# Patient Record
(demographics unavailable — no encounter records)

---

## 2025-04-24 NOTE — DATA REVIEWED
[de-identified] : see HPI [de-identified] : see HPI [de-identified] : cytology and molecular test reports reviewed, ICU hospital discharge records reviewed

## 2025-04-24 NOTE — CONSULT LETTER
[Dear  ___] : Dear  [unfilled], [Consult Letter:] : I had the pleasure of evaluating your patient, [unfilled]. [Please see my note below.] : Please see my note below. [Consult Closing:] : Thank you very much for allowing me to participate in the care of this patient.  If you have any questions, please do not hesitate to contact me. [Sincerely,] : Sincerely, [FreeTextEntry3] : \par  Tomy Rosales M.D., FACS, ECNU\par  Director [facility] Thyroid [facility] Parathyroid Surgery\par  [facility] [place] [facility]\par  Certified in Thyroid/Parathyroid/Neck Ultrasound, ECNU/ AIUM\par  \par  , Department of Otolaryngology\par  [facility] [employer] [facility] at [facility]\par

## 2025-04-24 NOTE — DATA REVIEWED
[de-identified] : see HPI [de-identified] : see HPI [de-identified] : cytology and molecular test reports reviewed, ICU hospital discharge records reviewed

## 2025-04-24 NOTE — PROCEDURE
[Image(s) Captured] : image(s) captured and filed [Unable to Cooperate with Mirror] : patient unable to cooperate with mirror [Gag Reflex] : gag reflex preventing mirror examination [Topical Lidocaine] : topical lidocaine [Oxymetazoline HCl] : oxymetazoline HCl [Flexible Endoscope] : examined with the flexible endoscope [Serial Number: ___] : Serial Number: [unfilled] [Cerumen Impaction] : Cerumen Impaction [Same] : same as the Pre Op Dx. [] : Removal of Cerumen [de-identified] : Verbal informed consent was obtained from the patient.  Findings: The nasal septum is minimally deviated to the left with spurs. There are no masses or polyps, and the nasal mucosa and secretions are normal. The choanae and posterior nasopharynx are normal without masses or drainage. The Eustachian tube orifices appear patent. The pharynx, including the posterior and lateral pharyngeal walls, the vallecula and base of tongue are normal without ulcerations, lesions or masses. The hypopharynx including the pyriform sinuses open well without pooling of secretions, mucosal lesions or masses. The supraglottic larynx including the epiglottis, petiole, arytenoids, glossoepiglottic, aryepiglottic and pharyngoepiglottic folds are normal without mucosal lesions, ulcerations or masses. The glottis reveals normal false vocal folds. The true vocal folds are glistening white, tense and of equal length, without paralysis, having symmetric mobility on adduction and abduction. There are no mucosal lesions, nodules, cysts, erythroplasia or leukoplakia. The posterior cricoid area has healthy pink mucosa in the interarytenoid area and esophageal inlet. There is mild thickening/pachydermia of the interarytenoid mucosa suggestive of posterior laryngitis from laryngopharyngeal acid reflux disease. The trachea is clear without narrowing in the immediate subglottic region, without deviation or lesions. ------------------------------------------------------------------------------------------- [de-identified] : nontoxic multinodular goiter with interval growth of a right lobe nodule. [FreeTextEntry1] :  impacted cerumen hearing loss ASC [FreeTextEntry6] : Copious cerumen removed from left ear canal with instrumentation.  TM is intact with normal mobility and improved hearing to finger rub.

## 2025-04-24 NOTE — HISTORY OF PRESENT ILLNESS
[de-identified] : Porsha is a generally healthy 71-year-old female [employer]  who has had a NTMNG since 2010. She is euthyroid but with a rising TSH now in the upper limit of normal range.   She had USG FNA biopsies from bilateral dominant nodules. Her gland had been monitored with serial ultrasound imaging and there has been growth primarily of a left mid-lower lobe nodule, now 3.2 cm.  Porsha denies recent shortness of breath, voice changes, dysphagia, anterior neck pain, neck pressure or mass. There is no family history of thyroid cancer. She had radiation exposures in her youth for facial acne.  She does suffer from GERD and takes Zantac regularly. Her weight gas been stable.  -------------------------------------------------------------------------------------------------------------------------------------------------------------------------------------   [FreeTextEntry1] : Porsha returns for a f/u visit after an FNAB from a left mid-lower lobe thyroid nodule (3.45 cm) in 2019. The cytology was reported back as a follicular lesion of undetermined significance, [place] category III. On further molecular testing (Thyroseq), there were no mutations or aberrant molecular abnormalities identified with a low risk of malignancy approximately 3%. She is euthyroid.   Porsha denies recent shortness of breath, voice changes, dysphagia, anterior neck pain, neck pressure or mass. She had a follow up thyroid ultrasound dated 3/1/2024.  A right lower lobe nodule now measures 2.7 x 1.5 x 1.8.  This nodule was biopsied in 2010 and benign and measuring 2.4 x 2.2 x 1.4 cm.  It has not significantly increased in size relative to 2010.    Her last thyroid ultrasound was obtained on 4/1/2025.  This was compared to the 3/1/2024 study.  The thyroid gland is slightly enlarged.  There is a nodule again in the right lower pole measuring up to 2.5 cm that is stable benign on biopsy in 2010.  There is a sub centimeter right mid lobe nodule.  The left thyroid lobe contains 3 nodules including a left lower pole 4.8 centimeters stable nodule but has been increasing in size slowly and benign on biopsy in 2019.  There is a 1.7 cm left mid lobe nodule TI-RADS TR 1, a stable left upper pole 1.7 cm nodule, TR-4, and another sub centimeter left mid lobe nodule.  Porsha denies recent shortness of breath, voice changes, dysphagia, anterior neck pain, neck pressure or mass.  She continues to have GERD medicated with omeprazole.  She sleeps with her back elevated at night. denies fever, body aches, cough, cyanosis, chest burning, anosmia or recent known COVID exposures.  Her last COVID infection was in 2022.  All family members at home are well.  She is vaccinated and boosted. She was hospitalized from December 22 to January 2 for what appears to be a pneumonia, and she required intubation x 2 and treated in the ICU for approximately 8 days.  Since then, she continues to have an intermittent cough with light mucus and pulmonary evaluation no specific findings to explain her cough and ENT evaluation recommended.  She does continue to have GERD and is taking omeprazole daily.  Her thick mucus production is worse in the morning hours.

## 2025-04-24 NOTE — REASON FOR VISIT
[FreeTextEntry2] : follow up for a non-toxic multinodular goiter. [FreeTextEntry1] : PCP is Brianna Drummond MD

## 2025-04-24 NOTE — CONSULT LETTER
[Dear  ___] : Dear  [unfilled], [Consult Letter:] : I had the pleasure of evaluating your patient, [unfilled]. [Please see my note below.] : Please see my note below. [Consult Closing:] : Thank you very much for allowing me to participate in the care of this patient.  If you have any questions, please do not hesitate to contact me. [Sincerely,] : Sincerely, [FreeTextEntry3] : \par  Tomy Rosales M.D., FACS, ECNU\par  Director Center for Thyroid [facility] Parathyroid Surgery\par  [facility] [place] [facility]\par  Certified in Thyroid/Parathyroid/Neck Ultrasound, ECNU/ AIUM\par  \par  , Department of Otolaryngology\par  [facility] [employer] [facility] at [facility]\par

## 2025-04-24 NOTE — HISTORY OF PRESENT ILLNESS
[de-identified] : Porsha is a generally healthy 71-year-old female [employer]  who has had a NTMNG since 2010. She is euthyroid but with a rising TSH now in the upper limit of normal range.   She had USG FNA biopsies from bilateral dominant nodules. Her gland had been monitored with serial ultrasound imaging and there has been growth primarily of a left mid-lower lobe nodule, now 3.2 cm.  Porsha denies recent shortness of breath, voice changes, dysphagia, anterior neck pain, neck pressure or mass. There is no family history of thyroid cancer. She had radiation exposures in her youth for facial acne.  She does suffer from GERD and takes Zantac regularly. Her weight gas been stable.  -------------------------------------------------------------------------------------------------------------------------------------------------------------------------------------   [FreeTextEntry1] : Porsha returns for a f/u visit after an FNAB from a left mid-lower lobe thyroid nodule (3.45 cm) in 2019. The cytology was reported back as a follicular lesion of undetermined significance, [place] category III. On further molecular testing (Thyroseq), there were no mutations or aberrant molecular abnormalities identified with a low risk of malignancy approximately 3%. She is euthyroid.   Porsha denies recent shortness of breath, voice changes, dysphagia, anterior neck pain, neck pressure or mass. She had a follow up thyroid ultrasound dated 3/1/2024.  A right lower lobe nodule now measures 2.7 x 1.5 x 1.8.  This nodule was biopsied in 2010 and benign and measuring 2.4 x 2.2 x 1.4 cm.  It has not significantly increased in size relative to 2010.    Her last thyroid ultrasound was obtained on 4/1/2025.  This was compared to the 3/1/2024 study.  The thyroid gland is slightly enlarged.  There is a nodule again in the right lower pole measuring up to 2.5 cm that is stable benign on biopsy in 2010.  There is a sub centimeter right mid lobe nodule.  The left thyroid lobe contains 3 nodules including a left lower pole 4.8 centimeters stable nodule but has been increasing in size slowly and benign on biopsy in 2019.  There is a 1.7 cm left mid lobe nodule TI-RADS TR 1, a stable left upper pole 1.7 cm nodule, TR-4, and another sub centimeter left mid lobe nodule.  Porsha denies recent shortness of breath, voice changes, dysphagia, anterior neck pain, neck pressure or mass.  She continues to have GERD medicated with omeprazole.  She sleeps with her back elevated at night. denies fever, body aches, cough, cyanosis, chest burning, anosmia or recent known COVID exposures.  Her last COVID infection was in 2022.  All family members at home are well.  She is vaccinated and boosted. She was hospitalized from December 22 to January 2 for what appears to be a pneumonia, and she required intubation x 2 and treated in the ICU for approximately 8 days.  Since then, she continues to have an intermittent cough with light mucus and pulmonary evaluation no specific findings to explain her cough and ENT evaluation recommended.  She does continue to have GERD and is taking omeprazole daily.  Her thick mucus production is worse in the morning hours.

## 2025-04-24 NOTE — PROCEDURE
[Image(s) Captured] : image(s) captured and filed [Unable to Cooperate with Mirror] : patient unable to cooperate with mirror [Gag Reflex] : gag reflex preventing mirror examination [Topical Lidocaine] : topical lidocaine [Oxymetazoline HCl] : oxymetazoline HCl [Flexible Endoscope] : examined with the flexible endoscope [Serial Number: ___] : Serial Number: [unfilled] [Cerumen Impaction] : Cerumen Impaction [Same] : same as the Pre Op Dx. [] : Removal of Cerumen [de-identified] : Verbal informed consent was obtained from the patient.  Findings: The nasal septum is minimally deviated to the left with spurs. There are no masses or polyps, and the nasal mucosa and secretions are normal. The choanae and posterior nasopharynx are normal without masses or drainage. The Eustachian tube orifices appear patent. The pharynx, including the posterior and lateral pharyngeal walls, the vallecula and base of tongue are normal without ulcerations, lesions or masses. The hypopharynx including the pyriform sinuses open well without pooling of secretions, mucosal lesions or masses. The supraglottic larynx including the epiglottis, petiole, arytenoids, glossoepiglottic, aryepiglottic and pharyngoepiglottic folds are normal without mucosal lesions, ulcerations or masses. The glottis reveals normal false vocal folds. The true vocal folds are glistening white, tense and of equal length, without paralysis, having symmetric mobility on adduction and abduction. There are no mucosal lesions, nodules, cysts, erythroplasia or leukoplakia. The posterior cricoid area has healthy pink mucosa in the interarytenoid area and esophageal inlet. There is mild thickening/pachydermia of the interarytenoid mucosa suggestive of posterior laryngitis from laryngopharyngeal acid reflux disease. The trachea is clear without narrowing in the immediate subglottic region, without deviation or lesions. ------------------------------------------------------------------------------------------- [de-identified] : nontoxic multinodular goiter with interval growth of a right lobe nodule. [FreeTextEntry1] :  impacted cerumen hearing loss ASC [FreeTextEntry6] : Copious cerumen removed from left ear canal with instrumentation.  TM is intact with normal mobility and improved hearing to finger rub.